# Patient Record
Sex: MALE | Race: ASIAN | NOT HISPANIC OR LATINO | Employment: FULL TIME | URBAN - METROPOLITAN AREA
[De-identification: names, ages, dates, MRNs, and addresses within clinical notes are randomized per-mention and may not be internally consistent; named-entity substitution may affect disease eponyms.]

---

## 2017-05-23 DIAGNOSIS — M25.561 ACUTE PAIN OF RIGHT KNEE: Primary | ICD-10-CM

## 2017-05-23 NOTE — PROGRESS NOTES
3 weeks history of medial right knee pain.  This started after playing football.  He twisted his knee and started feeling the pain.  Now he has fullness on the back of the knee as well.  I over-the-counter Aleve without significant improvement.  Since worsening.    No past medical history on file.    No past surgical history on file.    Review of patient's allergies indicates:  No Known Allergies    Current Outpatient Prescriptions   Medication Sig Dispense Refill    acetaminophen (TYLENOL) 500 MG tablet Take 500 mg by mouth every 6 (six) hours as needed.        naproxen sodium (ANAPROX) 220 MG tablet Take 220 mg by mouth 2 (two) times daily with meals.         No current facility-administered medications for this visit.        Family History   Problem Relation Age of Onset    Diabetes Mother     Heart disease Mother        Social History     Social History    Marital status: Unknown     Spouse name: N/A    Number of children: N/A    Years of education: N/A     Occupational History    Not on file.     Social History Main Topics    Smoking status: Never Smoker    Smokeless tobacco: Never Used    Alcohol use Not on file    Drug use: Unknown    Sexual activity: Not on file     Other Topics Concern    Not on file     Social History Narrative    No narrative on file       On exam:  Positive medial right knee tenderness.  Positive Bri's test.  No ligamentous instability.  No erythema.  The knee is not hot.  There is some fullness suggesting Bakers cyst.      Assessment:   1. Acute pain of right knee  Ambulatory consult to Physical Therapy    MRI Lower Extremity Joint WO Cont Right     Plan:  We discussed with the patient the assessment and recommendations. The following is the plan we agreed on:  1.  MRI of the right knee.  2.  Physical therapy.  3.  Return after MRI.  If it's positive we will consult orthopedics sports medicine

## 2017-05-24 ENCOUNTER — HOSPITAL ENCOUNTER (OUTPATIENT)
Dept: RADIOLOGY | Facility: OTHER | Age: 52
Discharge: HOME | End: 2017-05-24
Attending: ANESTHESIOLOGY
Payer: COMMERCIAL

## 2017-05-24 DIAGNOSIS — M25.561 ACUTE PAIN OF RIGHT KNEE: ICD-10-CM

## 2017-05-24 PROCEDURE — 73721 MRI JNT OF LWR EXTRE W/O DYE: CPT | Mod: TC,RT

## 2017-05-24 PROCEDURE — 73721 MRI JNT OF LWR EXTRE W/O DYE: CPT | Mod: 26,RT,, | Performed by: RADIOLOGY

## 2018-02-09 ENCOUNTER — OFFICE VISIT (OUTPATIENT)
Dept: SPORTS MEDICINE | Facility: CLINIC | Age: 53
End: 2018-02-09
Payer: COMMERCIAL

## 2018-02-09 ENCOUNTER — HOSPITAL ENCOUNTER (OUTPATIENT)
Dept: RADIOLOGY | Facility: HOSPITAL | Age: 53
Discharge: HOME | End: 2018-02-09
Attending: ORTHOPAEDIC SURGERY
Payer: COMMERCIAL

## 2018-02-09 VITALS
DIASTOLIC BLOOD PRESSURE: 67 MMHG | SYSTOLIC BLOOD PRESSURE: 110 MMHG | WEIGHT: 190 LBS | BODY MASS INDEX: 25.73 KG/M2 | HEART RATE: 75 BPM | HEIGHT: 72 IN

## 2018-02-09 DIAGNOSIS — M25.562 LEFT KNEE PAIN: ICD-10-CM

## 2018-02-09 DIAGNOSIS — M25.40 JOINT EFFUSION: ICD-10-CM

## 2018-02-09 DIAGNOSIS — M25.562 LEFT KNEE PAIN: Primary | ICD-10-CM

## 2018-02-09 PROCEDURE — 3008F BODY MASS INDEX DOCD: CPT | Mod: S$GLB,,, | Performed by: ORTHOPAEDIC SURGERY

## 2018-02-09 PROCEDURE — 99203 OFFICE O/P NEW LOW 30 MIN: CPT | Mod: S$GLB,,, | Performed by: ORTHOPAEDIC SURGERY

## 2018-02-09 PROCEDURE — 73564 X-RAY EXAM KNEE 4 OR MORE: CPT | Mod: TC,50,FY,PO

## 2018-02-09 PROCEDURE — 99999 PR PBB SHADOW E&M-EST. PATIENT-LVL III: CPT | Mod: PBBFAC,,, | Performed by: ORTHOPAEDIC SURGERY

## 2018-02-09 PROCEDURE — 73564 X-RAY EXAM KNEE 4 OR MORE: CPT | Mod: 26,50,, | Performed by: RADIOLOGY

## 2018-02-09 NOTE — PROGRESS NOTES
CC: Left knee pain    52 y.o. Male with a history of left knee pain who initially injured his knee in 2012.  History of meniscus tear.  No new CORNELIUS.  Reports worsening left knee pain over the last 2 weeks.  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  Has tried ice and Aleve PRN.  He states that the pain is severe and not responding to any conservative care.      He reports that the pain and weakness. It also bothers him at night.     + mechanical symptoms, - instability    Is affecting ADLs.  Pain is 8/10 at it's worst.    REVIEW OF SYSTEMS:  Constitution: Negative. Negative for chills, fever and night sweats.   HENT: Negative for congestion and headaches.    Eyes: Negative for blurred vision, left vision loss and right vision loss.   Cardiovascular: Negative for chest pain and syncope.   Respiratory: Negative for cough and shortness of breath.    Endocrine: Negative for polydipsia, polyphagia and polyuria.   Hematologic/Lymphatic: Negative for bleeding problem. Does not bruise/bleed easily.   Skin: Negative for dry skin, itching and rash.   Musculoskeletal: Negative for falls. Positive for left knee pain and  muscle weakness.   Gastrointestinal: Negative for abdominal pain and bowel incontinence.   Genitourinary: Negative for bladder incontinence and nocturia.   Neurological: Negative for disturbances in coordination, loss of balance and seizures.   Psychiatric/Behavioral: Negative for depression. The patient does not have insomnia.    Allergic/Immunologic: Negative for hives and persistent infections.     PAST MEDICAL HISTORY:    History reviewed. No pertinent past medical history.    PAST SURGICAL HISTORY:   History reviewed. No pertinent surgical history.    FAMILY HISTORY:   Family History   Problem Relation Age of Onset    Diabetes Mother     Heart disease Mother        SOCIAL HISTORY:   Social History     Social History    Marital status:      Spouse name: N/A    Number of  children: N/A    Years of education: N/A     Occupational History    Not on file.     Social History Main Topics    Smoking status: Never Smoker    Smokeless tobacco: Never Used    Alcohol use Not on file    Drug use: Unknown    Sexual activity: Not on file     Other Topics Concern    Not on file     Social History Narrative    No narrative on file       MEDICATIONS:     Current Outpatient Prescriptions:     acetaminophen (TYLENOL) 500 MG tablet, Take 500 mg by mouth every 6 (six) hours as needed.  , Disp: , Rfl:     naproxen sodium (ANAPROX) 220 MG tablet, Take 220 mg by mouth 2 (two) times daily with meals.  , Disp: , Rfl:     ALLERGIES:   Review of patient's allergies indicates:  No Known Allergies    VITAL SIGNS:   /67   Pulse 75   Ht 6' (1.829 m)   Wt 86.2 kg (190 lb)   BMI 25.77 kg/m²      PHYSICAL EXAMINATION  General:  The patient is alert and oriented x 3.  Mood is pleasant.  Observation of ears, eyes and nose reveal no gross abnormalities.  No labored breathing observed.    LEFT KNEE EXAMINATION     OBSERVATION / INSPECTION   Gait:   Nonantalgic   Alignment:  Neutral   Scars:   None   Muscle atrophy: Mild  Effusion:  2+  Warmth:  None   Discoloration:   none     TENDERNESS / CREPITUS (T / C):          T / C      T / C   Patella   - / -   Lateral joint line   + / -   Peripatellar medial  -  Medial joint line    + / -   Peripatellar lateral -  Medial plica   - / -   Patellar tendon -   Popliteal fossa   - / -   Quad tendon   -   Gastrocnemius   -   Prepatellar Bursa - / -   Quadricep   -   Tibial tubercle  -  Thigh/hamstring  -   Pes anserine/HS -  Fibula    -   ITB   - / -  Tibia     -   Tib/fib joint  - / -  LCL    -     MFC   - / -   MCL: Proximal  -    LFC   - / -    Distal   -          ROM: (* = pain)  PASSIVE   ACTIVE    Left :   5 / 0 / 145   5 / 0 / 145     Right :    5 / 0 / 145   5 / 0 / 145    Patellofemoral examination:  See above noted areas of tenderness.   Patella  position    Subluxation / dislocation: Centered           Sup. / Inf;   Normal   Crepitus (PF):    Absent   Patellar Mobility:       Medial-lateral:   Normal    Superior-inferior:  Normal    Inferior tilt   Normal    Patellar tendon:  Normal   Lateral tilt:    Normal   J-sign:     None   Patellofemoral grind:   No pain       MENISCAL SIGNS:     Pain on terminal extension:  -  Pain on terminal flexion:  -  Bris maneuver:  + (for pain)  Squat     + (for pain)    LIGAMENT EXAMINATION:  ACL / Lachman:  normal (-1 to 2mm)    PCL-Post.  drawer: normal 0 to 2mm  MCL- Valgus:  normal 0 to 2mm  LCL- Varus:  normal 0 to 2mm  Pivot shift: normal (Equal)   Dial Test: difference c/w other side   At 30° flexion: normal (< 5°)    At 90° flexion: normal (< 5°)   Reverse Pivot Shift:   normal (Equal)     STRENGTH: (* = with pain) PAINFUL SIDE   Quadricep   5/5   Hamstrin/5    EXTREMITY NEURO-VASCULAR EXAMINATION:   Sensation:  Grossly intact to light touch all dermatomal regions.   Motor Function:  Fully intact motor function at hip, knee, foot and ankle    DTRs;  quadriceps and  achilles 2+.  No clonus and downgoing Babinski.    Vascular status:  DP and PT pulses 2+, brisk capillary refill, symmetric.     XRAY (): Very minimal medial compartment tibiofemoral joint space narrowing is observed bilaterally, which has developed since the 2012 exam referenced above.  No patellofemoral joint space narrowing of significance.  Osseous structures demonstrate no evidence of recent or healing fracture, lytic destructive process, osteochondral defect, or other significant abnormality.  The lateral view of the left knee demonstrates some soft tissue fullness in the suprapatellar bursa on this side, consistent with a joint effusion, also a finding which was not present previously.  No joint effusion on the right.     ASSESSMENT:    Left knee pain    PLAN:   1. MRI left knee  2. Offered to aspirated effusion today.  Patient  declined.  3. Follow up in clinic for MRI results    All questions were answered, pt will contact us for questions or concerns in the interim.

## 2019-02-28 DIAGNOSIS — M25.511 SHOULDER PAIN, BILATERAL: Primary | ICD-10-CM

## 2019-02-28 DIAGNOSIS — M25.512 SHOULDER PAIN, BILATERAL: Primary | ICD-10-CM

## 2019-03-12 ENCOUNTER — CLINICAL SUPPORT (OUTPATIENT)
Dept: REHABILITATION | Facility: HOSPITAL | Age: 54
End: 2019-03-12
Payer: COMMERCIAL

## 2019-03-12 DIAGNOSIS — M79.601 PAIN IN BOTH UPPER EXTREMITIES: ICD-10-CM

## 2019-03-12 DIAGNOSIS — M25.60 STIFFNESS IN JOINT: ICD-10-CM

## 2019-03-12 DIAGNOSIS — M79.602 PAIN IN BOTH UPPER EXTREMITIES: ICD-10-CM

## 2019-03-12 DIAGNOSIS — R53.1 WEAKNESS: ICD-10-CM

## 2019-03-12 PROCEDURE — 97165 OT EVAL LOW COMPLEX 30 MIN: CPT | Mod: PN

## 2019-03-12 PROCEDURE — 97110 THERAPEUTIC EXERCISES: CPT | Mod: PN

## 2019-03-13 PROBLEM — M25.60 STIFFNESS IN JOINT: Status: ACTIVE | Noted: 2019-03-13

## 2019-03-13 PROBLEM — M79.602 PAIN IN BOTH UPPER EXTREMITIES: Status: ACTIVE | Noted: 2019-03-13

## 2019-03-13 PROBLEM — M79.601 PAIN IN BOTH UPPER EXTREMITIES: Status: ACTIVE | Noted: 2019-03-13

## 2019-03-13 PROBLEM — R53.1 WEAKNESS: Status: ACTIVE | Noted: 2019-03-13

## 2019-03-13 NOTE — PLAN OF CARE
[facility]  Initial Evaluation     Date: 3/12/2019  Patient: Naresh Grayson  Chart Number: [id number]    Therapy Diagnosis:   Encounter Diagnoses   Name Primary?    Pain in both upper extremities     Weakness     Stiffness in joint      Physician: Alejandro Mendenhall MD    Physician Orders: OT eval and treat  Medical Diagnosis: M25.511,M25.512 (ICD-10-CM) - Shoulder pain, bilateral  Evaluation Date: 3/12/2019  Insurance Authorization period Expiration: 12/31/2019  Plan of Care Expiration Period: 5/10/2019    Visit # / Visits Authorized: 1 / 50  Time In:505  Time Out: 610  Total Billable Time: 65 minutes  LCE1 TE1    Precautions: Standard    Subjective     Involved Side: Bilateral; Left greater than Right  Dominant Side: Right  Date of Onset: 2-3 months  Mechanism of Injury: insidious onset with progressively worsening symptoms of Bilateral shoulder pain Left greater than right; pt states he may have injured shoulders while working out however 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   History of Current Condition: Pt presents to clinic today with referral for bilateral shoulder pain, weakness and stiffness, Left greater than Right.   Surgical Procedure: none  Imaging: xray revealed no abnormalities; no MRI  Previous Therapy: no formal therapy has been doing some band exercises on his own.     Patient's Goals for Therapy: to decrease pain and increase functional use. 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     Current FunctionalStatus   Home/Living environment : lives with their family      Limitation of Functional Status as follows:   ADLs/IADLs:     - Feeding: Independent    - Bathing: Independent    - Dressing/Grooming: Independent    - Driving: Independent     Leisure: gym routine, fishing      Past Medical History/Physical Systems Review:   Naresh Grayson  has no past medical history on file.    Naresh Grayson  has no past surgical history on file.    Naresh has a current medication list which includes the following prescription(s): acetaminophen, naproxen sodium, pantoprazole, and promethazine.    Review of patient's allergies indicates:  No Known Allergies       Objective     Sensation Test: Patient denies any numbness/tingling    Observation/Inspection:rounded shoulders    Range of Motion/Strength:   Shoulder  Left   Right  Pain/Dysfunction with Movement    AROM PROM MMT AROM PROM MMT    Flexion 140 150 4-/5 155 WFL 4/5    Extension 50 NT 5/5 60 WFL 4/5    Abduction 90 150 4-/5 155 WFL 4/5    HorizAdduction 35 NT 4-/5 40 WFL 4/5    Internal rotation L4 25 4/5 L4 WFL 4/5    ER at 90° abd 65* 65 4-/5 90 55* 4/5    ER at 0° abd 70 60* 4-/5 60 WFL 4/5    NT=Not tested; *pain    ROM Comments:   Pain at end range    Painful Arc: none noted in Bilateral shoulders    Tenderness upon Palpation:      Positive: Lateral Subacromial Space, Bicipital Groove and Supraspinatus Region on Left side     Special Tests:  AC Joint Left Right   Empty Can Test - -   Drop Arm test - -   Hawkin's Kenndy + -   Neer's Test + -     Scapular Control/Dyskinesis:    Normal / Subtle / Obvious  Comments    Left  obvi -    Right  obvi -       CMS Impairment/Limitation/Restriction for FOTO Shoulder Survey    Therapist reviewed FOTO scores for Naresh Grayson on 3/12/2019.   FOTO documents entered into [employer] - see Media section.    Limitation Score: 47%  Category: Self Care    Current :  "CK = at least 40% but < 60% impaired, limited or restricted  Goal: CI = at least 1% but < 20% impaired, limited or restricted         Treatment     Treatment Time In: 550  Treatment Time Out: 610  Total Treatment time separate from Evaluation time:10    Naresh received therapeutic exercises for 20 minutes including:  -Shoulder flexion with the wand 5 repetitions, Sidelying Abduction 5 repetitions, Sidelying External Rotation 5 repetitions, Standing Dowel Abduction 5 repetitions, Theraband pull downs 5 repetitions, Theraband Rows 5 repetitions, Theraband External Rotation 5 repetitions, Theraband Internal Rotation 5 repetitions, Theraband Horizontal Abduction 5 repetitions, wall pushups 5 repetitions, corner pectoralis stretch 1/30", Internal rotation pulley stretch 1/30"    Home Exercise Program/Education:  Issued HEP (see patient instructions in EMR) and educated on modality use for pain management . Exercises were reviewed and Naresh was able to demonstrate them prior to the end of the session.   Pt received a written copy of exercises to perform at home. Naresh demonstrated good  understanding of the education provided.  Pt was advised to perform these exercises free of pain, and to stop performing them if pain occurs.    Patient/Family Education: role of OT, goals for OT, scheduling/cancellations - pt verbalized understanding. Discussed insurance limitations with patient.    Assessment     Naresh Grayson is a 53 y.o. male referred to outpatient occupational therapy and presents with a medical diagnosis of Bilateral shoulder pain Left greater than Right, resulting in Decreased ROM, Decreased muscle strength, Increased pain and Joint Stiffness and demonstrates limitations as described in the chart below. Following medical record review it is determined that pt will benefit from occupational therapy services in order to maximize pain free and/or functional use of bilateral shoulders. The following goals were " discussed with the patient and patient is in agreement with them as to be addressed in the treatment plan. The patient's rehab potential is Good.     Anticipated barriers to occupational therapy: none  Pt has no cultural, educational or language barriers to learning provided.    Profile and History Assessment of Occupational Performance Level of Clinical Decision Making Complexity Score   Occupational Profile:   Naresh Grayson is a 53 y.o. male who lives with their family and is currently employed as gastroenterologist/surgeon. Naresh Grayson has difficulty with  grooming and dressing  housework/household chores and gym routine and fishing  affecting his/her daily functional abilities. His/her main goal for therapy is to decrease pain and increase functional use Left shoulder.     Comorbidities:   None noted    Medical and Therapy History Review:   Brief               Performance Deficits    Physical:  Joint Mobility  Joint Stability  Muscle Power/Strength  Muscle Endurance  Muscle Tone  Postural Control  Pain    Cognitive:  No Deficits    Psychosocial:    No Deficits     Clinical Decision Making:  low    Assessment Process:  Problem-Focused Assessments    Modification/Need for Assistance:  Not Necessary    Intervention Selection:  Several Treatment Options       low  Based on PMHX, co morbidities , data from assessments and functional level of assistance required with task and clinical presentation directly impacting function.       The following goals were discussed with the patient and patient is in agreement with them as to be addressed in the treatment plan.     Goals:     Short Term Goals to be met in 4 weeks: (4/11/2019)  1) Initiate Hep   2) Pt will increase Left shoulder AROM by 10 degrees grossly for improved performance with overhead ADL's  3) Pt will report 4/10 pain in (Left)shoulder at worst  4) Pt will demonstrate increased MMT to 4+/5 grossly Left shoulder  5) Patient will be able to achieve  less than or equal to 25% on FOTO shoulder survey demonstrating overall improved functional ability with upper extremity.     Long Term Goals to be met by discharge:  1) Independent with HEP  2) Pt will demonstrate (Left) shoulder AROM WNL grossly for [place] with ADL's  3) Pt will demonstrate (Left) shoulder MMT WNL grossly for [place] with functional activities  4) Independent and pain free with ADL's and IADL's  5) Patient will be able to achieve less than or equal to 10% on FOTO shoulder survey demonstrating overall improved functional ability with upper extremity.       Plan   Certification Period/Plan of care expiration: 3/12/2019 to 5/10/2019.    Outpatient Occupational Therapy 2 times weekly for 8 weeks to include the following interventions: Manual therapy/joint mobilizations, Modalities for pain management, Therapeutic exercises/activities., Strengthening, Joint Protection and Energy Conservation.      TARAS Rojo

## 2019-04-12 ENCOUNTER — CLINICAL SUPPORT (OUTPATIENT)
Dept: REHABILITATION | Facility: HOSPITAL | Age: 54
End: 2019-04-12
Payer: COMMERCIAL

## 2019-04-12 DIAGNOSIS — M25.60 STIFFNESS IN JOINT: ICD-10-CM

## 2019-04-12 DIAGNOSIS — M79.602 PAIN IN BOTH UPPER EXTREMITIES: ICD-10-CM

## 2019-04-12 DIAGNOSIS — M79.601 PAIN IN BOTH UPPER EXTREMITIES: ICD-10-CM

## 2019-04-12 DIAGNOSIS — R53.1 WEAKNESS: ICD-10-CM

## 2019-04-12 PROCEDURE — 97140 MANUAL THERAPY 1/> REGIONS: CPT | Mod: PN

## 2019-04-12 PROCEDURE — 97110 THERAPEUTIC EXERCISES: CPT | Mod: PN

## 2019-04-12 NOTE — PROGRESS NOTES
"  Occupational Therapy Daily Treatment Note     Name: Naresh Grayson  Clinic Number: [id number]    Therapy Diagnosis:   Encounter Diagnoses   Name Primary?    Pain in both upper extremities     Weakness     Stiffness in joint      Physician: Alejandro Mendenhall MD    Visit Date: 4/12/2019   Physician Orders: OT eval and treat  Medical Diagnosis: M25.511,M25.512 (ICD-10-CM) - Shoulder pain, bilateral  Evaluation Date: 3/12/2019  Insurance Authorization period Expiration: 12/31/2019  Plan of Care Expiration Period: 5/10/2019     Visit # / Visits Authorized: 2 / 50  Time In:8:07 am  Time Out: 9:00 am   Total Billable Time: 53 minutes  1 MT 3 TE     Precautions: Standard      Subjective     Pt reports: "Its been getting better but the one movement on my side is a real killer"  he was compliant with home exercise program given last session.   Response to previous treatment:Fair required increased cues for proper exercises techs  Functional change: Improved ROM    Pain: 0/10 5/10 with PROM  Location: bilateral shoulder  But L more than R    Objective     Naresh received the following manual therapy techniques for 10 minutes:   -Mt: Pt recieved manual therapy consisting of PROM in all planes, joint mobilization (grades I-II) with gentle oscillations at acromioclavicular and glenohumeral joint along with myofascial release and STM to surrounding musculature (biceps, pects, deltoids, traps, triceps etc.) to decrease stiffness and pain with movements.       Naresh received therapeutic exercises for 43 minutes including:  -  Exercises        PROM (BUE) Shoulder Flexion/Abduction/Internal rotation/External Rotation 10x   Supine dowel Flexion/Chest Press 3#  2/15   Sidelying Abduction 0#  2/15   Sidelying External Rotation 2/15  2/15       Sleeper Stretch 3/30"               Corner pectoralis stretch 3/30"   Theraband Lats Green  2/15   Theraband Rows Green  2/15   Theraband Internal Rotation/External Rotation Green  2/15      "   Range of Motion/Strength:   Shoulder   Left     Right   Pain/Dysfunction with Movement     AROM PROM MMT AROM PROM MMT     Flexion 140 150 4-/5 155 WFL 4/5     Extension 50 NT 5/5 60 WFL 4/5     Abduction 90 150 4-/5 155 WFL 4/5     HorizAdduction 35 NT 4-/5 40 WFL 4/5     Internal rotation L4 25 4/5 L4 WFL 4/5     ER at 90° abd 65* 65 4-/5 90 55* 4/5     ER at 0° abd 70 60* 4-/5 60 WFL 4/5     NT=Not tested; *pain     Shoulder L and R Date:4/12/2019        AROM L/R PROM L/R MMT   Flx [id number] 155/WNL    Ext 55/65     Abd 100/155 150/WNL    ER 70/60 65/WNL    IR L4/L4 wnl    ER @ 90 60/75 55/65                      Home Exercises and Education Provided     Education provided:   - None  - Progress towards goals     Written Home Exercises Provided: Patient instructed to cont prior HEP.  Exercises were reviewed and Naresh was able to demonstrate them prior to the end of the session.  Naresh demonstrated good  understanding of the HEP provided.   .   See EMR under Patient Instructions for exercises provided prior visit.        Assessment     Pt would continue to benefit from skilled OT. He did well with session. Main focus on reeducation for exercises and techniques to ensure pt completing exercises accurately. He had a lot of questions regarding a few exercises and reasoning for pain with OT explanation provided. Pt did well with session. ST tightness and main restrictions with bilateral pects and deltoid pain. Tolerated MT and PROM fair. Still limited mainly with abduction and ER. OT reviewed exercises program with pt with good carryover and understanding noted. Pt will be out of town next week and follow up the following week with therapy requesting only Friday appts.      Naresh is progressing well towards his goals and there are no updates to goals at this time. Pt prognosis is Good.     Pt will continue to benefit from skilled outpatient occupational therapy to address the deficits listed in the problem list on  initial evaluation provide pt/family education and to maximize pt's level of independence in the home and community environment.     Anticipated barriers to occupational therapy: scheduling, pt is a surgeon with limited appointment availability.     Pt's spiritual, cultural and educational needs considered and pt agreeable to plan of care and goals.    Goals:  Short Term Goals to be met in 4 weeks: (4/11/2019)  1) Initiate Hep MET 3/12/19  2) Pt will increase Left shoulder AROM by 10 degrees grossly for improved performance with overhead ADL's Progressing 4/12/2019  3) Pt will report 4/10 pain in (Left)shoulder at worst Progressing 4/12/2019  4) Pt will demonstrate increased MMT to 4+/5 grossly Left shoulder Progressing 4/12/2019  5) Patient will be able to achieve less than or equal to 25% on FOTO shoulder survey demonstrating overall improved functional ability with upper extremity. Progressing 4/12/2019       Long Term Goals to be met by discharge:  1) Independent with HEP Progressing 4/12/2019  2) Pt will demonstrate (Left) shoulder AROM WNL grossly for [place] with ADL's Progressing 4/12/2019  3) Pt will demonstrate (Left) shoulder MMT WNL grossly for [place] with functional activities Progressing 4/12/2019  4) Independent and pain free with ADL's and IADL's Progressing 4/12/2019  5) Patient will be able to achieve less than or equal to 10% on FOTO shoulder survey demonstrating overall improved functional ability with upper extremity. Progressing 4/12/2019         Plan   Continue per initial POC.   Updates/Grading for next session: Progress as tolerated.      Jordan Andrade, OT

## 2019-04-26 ENCOUNTER — CLINICAL SUPPORT (OUTPATIENT)
Dept: REHABILITATION | Facility: HOSPITAL | Age: 54
End: 2019-04-26
Payer: COMMERCIAL

## 2019-04-26 DIAGNOSIS — M79.601 PAIN IN BOTH UPPER EXTREMITIES: ICD-10-CM

## 2019-04-26 DIAGNOSIS — M79.602 PAIN IN BOTH UPPER EXTREMITIES: ICD-10-CM

## 2019-04-26 DIAGNOSIS — M25.60 STIFFNESS IN JOINT: ICD-10-CM

## 2019-04-26 DIAGNOSIS — R53.1 WEAKNESS: ICD-10-CM

## 2019-04-26 PROCEDURE — 97110 THERAPEUTIC EXERCISES: CPT | Mod: PN

## 2019-04-26 PROCEDURE — 97140 MANUAL THERAPY 1/> REGIONS: CPT | Mod: PN

## 2019-04-26 NOTE — PROGRESS NOTES
"  Occupational Therapy Daily Treatment Note     Name: Naresh Grayson  Clinic Number: [id number]    Therapy Diagnosis:   Encounter Diagnoses   Name Primary?    Pain in both upper extremities     Weakness     Stiffness in joint      Physician: Alejandro Mendenhall MD    Visit Date: 4/26/2019   Physician Orders: OT eval and treat  Medical Diagnosis: M25.511,M25.512 (ICD-10-CM) - Shoulder pain, bilateral  Evaluation Date: 3/12/2019  Insurance Authorization period Expiration: 12/31/2019  Plan of Care Expiration Period: 5/10/2019     Visit # / Visits Authorized: 3 / 50  Time In:8:45 am  Time Out: 9:30 am   Total Billable Time: 45 minutes  1 MT 2 TE      Precautions: Standard      Subjective     Pt reports: "Its ok I didn't do much exercises on my vacation but they were helping before"  he was compliant with home exercise program given last session.   Response to previous treatment:Fair required increased cues for proper exercises techs  Functional change: Improved ROM    Pain: 0/10 5/10 with PROM on L with ER abduction  Location: bilateral shoulder  But L more than R    Objective     Naresh received the following manual therapy techniques for 15 minutes:   -Mt: Pt recieved manual therapy consisting of PROM in all planes, joint mobilization (grades I-II) with gentle oscillations at acromioclavicular and glenohumeral joint along with myofascial release and STM to surrounding musculature (biceps, pects, deltoids, traps, triceps etc.) to decrease stiffness and pain with movements. L and R UE      Naresh received therapeutic exercises for 30 minutes including:  -  Exercises        PROM (BUE) Shoulder Flexion/Abduction/Internal rotation/External Rotation 10x   Supine dowel Flexion/Chest Press 3#  2/15   Sidelying Abduction 1#  2/15   Sidelying External Rotation 2/15     Supine pect stretch   Butterfly stretch adapted unable to put hands behind head 3/30"   Sleeper Stretch 3/30"   Wall angels unable    Wall slides towel  2/15   IR " "stretch 3/30"       Ext with dowel  1#   2/15                Home Exercises and Education Provided     Education provided:   - None  - Progress towards goals     Written Home Exercises Provided: Patient instructed to cont prior HEP.  Exercises were reviewed and Naresh was able to demonstrate them prior to the end of the session.  Naresh demonstrated good  understanding of the HEP provided.   .   See EMR under Patient Instructions for exercises provided prior visit.        Assessment     Pt would continue to benefit from skilled OT. He did well this session with stiffness in pects and with ER noted. ST restrictions and tightness limiting ROM with more MT focus next session. He did well with PROM and stretching in supine. Still limited with periscap strength and mobility into ER. Very difficult to perform wall angels due to lack of abduction and ER. More focus on periscap strengthening and new exercises today. He tolerated these well. Next session would benefit from more periscap exercises and MT with updated HEP to be given.      Naresh is progressing well towards his goals and there are no updates to goals at this time. Pt prognosis is Good.     Pt will continue to benefit from skilled outpatient occupational therapy to address the deficits listed in the problem list on initial evaluation provide pt/family education and to maximize pt's level of independence in the home and community environment.     Anticipated barriers to occupational therapy: scheduling, pt is a surgeon with limited appointment availability.     Pt's spiritual, cultural and educational needs considered and pt agreeable to plan of care and goals.    Goals:  Short Term Goals to be met in 4 weeks: (4/11/2019)  1) Initiate Hep MET 3/12/19  2) Pt will increase Left shoulder AROM by 10 degrees grossly for improved performance with overhead ADL's Progressing 4/26/2019  3) Pt will report 4/10 pain in (Left)shoulder at worst Progressing 4/26/2019  4) Pt will " demonstrate increased MMT to 4+/5 grossly Left shoulder Progressing 4/26/2019  5) Patient will be able to achieve less than or equal to 25% on FOTO shoulder survey demonstrating overall improved functional ability with upper extremity. Progressing 4/26/2019       Long Term Goals to be met by discharge:  1) Independent with HEP Progressing 4/26/2019  2) Pt will demonstrate (Left) shoulder AROM WNL grossly for [place] with ADL's Progressing 4/26/2019  3) Pt will demonstrate (Left) shoulder MMT WNL grossly for [place] with functional activities Progressing 4/26/2019  4) Independent and pain free with ADL's and IADL's Progressing 4/26/2019  5) Patient will be able to achieve less than or equal to 10% on FOTO shoulder survey demonstrating overall improved functional ability with upper extremity. Progressing 4/26/2019         Plan   Continue per initial POC.   Updates/Grading for next session: Progress as tolerated.      Jordan Andrade, OT

## 2019-05-03 ENCOUNTER — CLINICAL SUPPORT (OUTPATIENT)
Dept: REHABILITATION | Facility: HOSPITAL | Age: 54
End: 2019-05-03
Payer: COMMERCIAL

## 2019-05-03 DIAGNOSIS — M79.602 PAIN IN BOTH UPPER EXTREMITIES: ICD-10-CM

## 2019-05-03 DIAGNOSIS — M25.60 STIFFNESS IN JOINT: ICD-10-CM

## 2019-05-03 DIAGNOSIS — M79.601 PAIN IN BOTH UPPER EXTREMITIES: ICD-10-CM

## 2019-05-03 DIAGNOSIS — R53.1 WEAKNESS: ICD-10-CM

## 2019-05-03 PROCEDURE — 97110 THERAPEUTIC EXERCISES: CPT | Mod: PN

## 2019-05-03 PROCEDURE — 97140 MANUAL THERAPY 1/> REGIONS: CPT | Mod: PN

## 2019-05-03 NOTE — PATIENT INSTRUCTIONS
"Using the Foam Roll for Thoracic Mobility and Posture     1. Lie on your back on the ground and notice what parts of your spine and soft tissue are in contact with the ground. Pay specific attention to how far your shoulders are off the ground. You can also take your arms out to your sides with your elbows bent, and move your shoulders into external rotation, noticing how much movement you   have and how far your hands are off the floor.           2. Lie on the foam roll with the roll parallel and directly under your spine. Your head should be resting on the roll while your knees are bent and feet are on the ground to maintain balance.             3. With your shoulders flexed to 90 degrees and your elbows straight, protract your shoulder blades and reach for the ceiling, then retract your shoulder blades, moving your hands away from the ceiling. Focus on reaching as far as you can, then "wrap" your shoulder blades around the roll as you retract. Repeat 10 times. Then perform the same movement but reach for the ceiling with one arm at a time. Repeat 5 times with each arm.                    4. Flex and extend your shoulders, arms going in opposite directions. Repeat 10 times.            5. Take your arms out to the side (horizontal abduction) and then cross them over your chest   (horizontal adduction). Repeat 10 times.                6. With your shoulders abducted to 90 degrees, elbows bent to 90 degrees, and arms in external rotation, slowly move your arms up and down (parallel to floor) to see where your pecs are the tightest. When you find a tight area, stop and rest in that position for 30 seconds. A 1 - 3 pound weight can be placed in each hand during the stretch for increased stress if desired. Repeat this at other points in the range that are tight.                      11.Stand upright and lift your sternum (breast bone) to bring your shoulder blades back and down and to open up your chest. Try to maintain " S: Feeling well. No significant complaints or concerns. Reports consistent, daily fetal mvmt. Denies ctxs, LOF, or vaginal bldng. Denies travel to [place] affected area. Started taking iron supplement, Flordadix, for anemia. O: See prenatal flowsheet for maternal and fetal exam  Blood pressure 118/74, weight 166 lb (75.3 kg), last menstrual period 2018. A:  33w3d   Size=Dates    P: reviewed PTL precautions.    See prenatal checklist for other topics covered during today's visit  RTO in  2 weeks for MAURY with CNOLGA this position during your daily activities.     ©[id number] [employer] All Rights Reserved.

## 2019-05-03 NOTE — PROGRESS NOTES
"  Occupational Therapy Daily Treatment Note     Name: Naresh Grayson  Clinic Number: [id number]    Therapy Diagnosis:   Encounter Diagnoses   Name Primary?    Pain in both upper extremities     Weakness     Stiffness in joint      Physician: Alejandro Mendenhall MD    Visit Date: 5/3/2019   Physician Orders: OT eval and treat  Medical Diagnosis: M25.511,M25.512 (ICD-10-CM) - Shoulder pain, bilateral  Evaluation Date: 3/12/2019  Insurance Authorization period Expiration: 12/31/2019  Plan of Care Expiration Period: 5/10/2019     Visit # / Visits Authorized: 4 / 50  Time In:8:25 am  Time Out: 9:20 am   Total Billable Time: 55 minutes  1 MT 3 TE      Precautions: Standard      Subjective     Pt reports: "Still some of the same movements bothering me but I feel a little better"  he was compliant with home exercise program given last session.   Response to previous treatment:Fair required increased cues for proper exercises techs  Functional change: Improved ROM    Pain: 0/10 5/10 with PROM on L with ER abduction  Location: bilateral shoulder  But L more than R    Objective     Naresh received the following manual therapy techniques for 15 minutes:   -Mt: Pt recieved manual therapy consisting of PROM in all planes, joint mobilization (grades I-II) with gentle oscillations at acromioclavicular and glenohumeral joint along with myofascial release and STM to surrounding musculature (biceps, pects, deltoids, traps, triceps etc.) to decrease stiffness and pain with movements. L and R UE      Naresh received therapeutic exercises for 30 minutes including:  -  Exercises        PROM (BUE) Shoulder Flexion/Abduction/Internal rotation/External Rotation 10x   Supine dowel Flexion/Chest Press 4#  2/15   Sidelying Abduction 1#  2/15   Sidelying External Rotation 1#  2/15     Supine pect stretch   Butterfly stretch adapted unable to put hands behind head 3/30"   Sleeper Stretch 3/30"   Wall angels unable    Wall slides towel  2/15 " "  Horizontal abduction  Red band  2/15       Foam roll stretches for thoracic mobs and periscap stretches    Foam roll stretch 3/30"   Scap retract/protraction 2/10    Alternating flex/ext BUEs  2/10   Wall angels 2/10             Shoulder L Date:5/3/2019        AROM PROM MMT   Flx 155 160    Ext 60 WNL    Abd 110 156    ER 80 70    IR L4 WNL    ER @ 90 60 65                         Home Exercises and Education Provided     Education provided:   - None  - Progress towards goals     Written Home Exercises Provided: Patient instructed to cont prior HEP.  Exercises were reviewed and Naresh was able to demonstrate them prior to the end of the session.  Naresh demonstrated good  understanding of the HEP provided.   .   See EMR under Patient Instructions for exercises provided prior visit.        Assessment     Pt would continue to benefit from skilled OT. He did a lot better with OT session today. Main location of pain over middle deltoid insertion but was able to get relief with MT and PROM. He had more pain with flexion of the shoulder mostly and was still limited with abduction with external rotation. Still increased pect stiffness and rounded shoulders. States trying to correct posture more throughout the week. He did better with periscap exercises and showed good understanding of new exercises and HEP. Still limited with periscap strength and mobility into ER.   Naresh is progressing well towards his goals and there are no updates to goals at this time. Pt prognosis is Good.     Pt will continue to benefit from skilled outpatient occupational therapy to address the deficits listed in the problem list on initial evaluation provide pt/family education and to maximize pt's level of independence in the home and community environment.     Anticipated barriers to occupational therapy: scheduling, pt is a surgeon with limited appointment availability.     Pt's spiritual, cultural and educational needs considered and pt " agreeable to plan of care and goals.    Goals:  Short Term Goals to be met in 4 weeks: (4/11/2019)  1) Initiate Hep MET 3/12/19  2) Pt will increase Left shoulder AROM by 10 degrees grossly for improved performance with overhead ADL's Progressing 5/3/2019  3) Pt will report 4/10 pain in (Left)shoulder at worst Progressing 5/3/2019  4) Pt will demonstrate increased MMT to 4+/5 grossly Left shoulder Progressing 5/3/2019  5) Patient will be able to achieve less than or equal to 25% on FOTO shoulder survey demonstrating overall improved functional ability with upper extremity. Progressing 5/3/2019       Long Term Goals to be met by discharge:  1) Independent with HEP Progressing 5/3/2019  2) Pt will demonstrate (Left) shoulder AROM WNL grossly for [place] with ADL's Progressing 5/3/2019  3) Pt will demonstrate (Left) shoulder MMT WNL grossly for [place] with functional activities Progressing 5/3/2019  4) Independent and pain free with ADL's and IADL's Progressing 5/3/2019  5) Patient will be able to achieve less than or equal to 10% on FOTO shoulder survey demonstrating overall improved functional ability with upper extremity. Progressing 5/3/2019         Plan   Continue per initial POC.   Updates/Grading for next session: Progress as tolerated.      Jordan Andrade OT

## 2019-05-16 NOTE — PROGRESS NOTES
"  Occupational Therapy Daily Treatment Note     Name: Naresh Grayson  Clinic Number: [id number]    Therapy Diagnosis:   Encounter Diagnoses   Name Primary?    Pain in both upper extremities     Weakness     Stiffness in joint      Physician: Alejandro Mendenhall MD    Visit Date: 5/17/2019   Physician Orders: OT eval and treat  Medical Diagnosis: M25.511,M25.512 (ICD-10-CM) - Shoulder pain, bilateral  Evaluation Date: 3/12/2019  Insurance Authorization period Expiration: 12/31/2019  Plan of Care Expiration Period: 5/10/2019  FOTO improved to 39% from 47% at eval     Visit # / Visits Authorized: 5 / 50  Time In:8:45 am  Time Out: 9:23 am   Total Billable Time: 38  minutes  1 MT 2 TE      Precautions: Standard      Subjective     Pt reports: "I wasn't able to do my stuff in like the past two weeks abelardo been really busy"  he was compliant with home exercise program given last session.   Response to previous treatment:Fair required increased cues for proper exercises techs  Functional change: Improved ROM    Pain: 0/10 5/10 with PROM on L with ER abduction  Location: bilateral shoulder  But L more than R    Objective     Naresh received the following manual therapy techniques for 10 minutes:   -Mt: Pt recieved manual therapy consisting of PROM in all planes, joint mobilization (grades I-II) with gentle oscillations at acromioclavicular and glenohumeral joint along with myofascial release and STM to surrounding musculature (biceps, pects, deltoids, traps, triceps etc.) to decrease stiffness and pain with movements. L and R UE      Naresh received therapeutic exercises for 28 minutes including:  -  Exercises        PROM (BUE) Shoulder Flexion/Abduction/Internal rotation/External Rotation 10x       Sidelying Abduction 2#  2/15       Supine pect stretch   Butterfly stretch adapted unable to put hands behind head 3/30"                       Foam roll stretches for thoracic mobs and periscap stretches    Foam roll stretch 3/30" "   Scap retract/protraction/ Serratus Punch 2/10    Alternating flex/ext BUEs  2/10   Wall angels 2/10       UPDATE:   Shoulder L  3/12/19    5/17/19     AROM PROM AROM PROM   Flx 155 160 168 164   Ext 60 WNL 73 WNL   Abd 110 156 112 170   ER 80 70 62 70   IR L4 WNL L4 87 (WNL)   ER @ 90 60 65 76 50   Horizontal abduction   30               Home Exercises and Education Provided     Education provided:   - None  - Progress towards goals     Written Home Exercises Provided: Patient instructed to cont prior HEP.  Exercises were reviewed and Naresh was able to demonstrate them prior to the end of the session.  Naresh demonstrated good  understanding of the HEP provided.   .   See EMR under Patient Instructions for exercises provided prior visit.        Assessment     Pt would continue to benefit from skilled OT. Pt tolerated session well with improved passive and active ROM today. He tolerated exercises better through better range with less restrictions. Better understanding of exercises and proper techniques used. He only has pain in the deltoid with main limitation being abductions. More focus on abduction and periscap strengthening. He was unable to tolerate Ts, Ys and W's on therapy ball. He is still limited by weakness and stiffness with ST restrictions. Naresh is progressing well towards his goals and there are no updates to goals at this time. Pt prognosis is Good.     Pt will continue to benefit from skilled outpatient occupational therapy to address the deficits listed in the problem list on initial evaluation provide pt/family education and to maximize pt's level of independence in the home and community environment.     Anticipated barriers to occupational therapy: scheduling, pt is a surgeon with limited appointment availability.     Pt's spiritual, cultural and educational needs considered and pt agreeable to plan of care and goals.    Goals:  Short Term Goals to be met in 4 weeks: (4/11/2019)  1) Initiate Hep  MET 3/12/19  2) Pt will increase Left shoulder AROM by 10 degrees grossly for improved performance with overhead ADL's Progressing 5/17/2019  3) Pt will report 4/10 pain in (Left)shoulder at worst Progressing 5/17/2019  4) Pt will demonstrate increased MMT to 4+/5 grossly Left shoulder Progressing 5/17/2019  5) Patient will be able to achieve less than or equal to 25% on FOTO shoulder survey demonstrating overall improved functional ability with upper extremity. Progressing 5/17/2019       Long Term Goals to be met by discharge:  1) Independent with HEP Progressing 5/17/2019  2) Pt will demonstrate (Left) shoulder AROM WNL grossly for [place] with ADL's Progressing 5/17/2019  3) Pt will demonstrate (Left) shoulder MMT WNL grossly for [place] with functional activities Progressing 5/17/2019  4) Independent and pain free with ADL's and IADL's Progressing 5/17/2019  5) Patient will be able to achieve less than or equal to 10% on FOTO shoulder survey demonstrating overall improved functional ability with upper extremity. Progressing 5/17/2019         Plan   Continue per initial POC.   Updates/Grading for next session: Progress as tolerated.      Jordan Andrade, OT

## 2019-05-17 ENCOUNTER — CLINICAL SUPPORT (OUTPATIENT)
Dept: REHABILITATION | Facility: HOSPITAL | Age: 54
End: 2019-05-17
Payer: COMMERCIAL

## 2019-05-17 DIAGNOSIS — R53.1 WEAKNESS: ICD-10-CM

## 2019-05-17 DIAGNOSIS — M25.60 STIFFNESS IN JOINT: ICD-10-CM

## 2019-05-17 DIAGNOSIS — M79.601 PAIN IN BOTH UPPER EXTREMITIES: ICD-10-CM

## 2019-05-17 DIAGNOSIS — M79.602 PAIN IN BOTH UPPER EXTREMITIES: ICD-10-CM

## 2019-05-17 PROCEDURE — 97110 THERAPEUTIC EXERCISES: CPT | Mod: PN

## 2019-05-17 PROCEDURE — 97140 MANUAL THERAPY 1/> REGIONS: CPT | Mod: PN

## 2019-05-31 ENCOUNTER — CLINICAL SUPPORT (OUTPATIENT)
Dept: REHABILITATION | Facility: HOSPITAL | Age: 54
End: 2019-05-31
Payer: COMMERCIAL

## 2019-05-31 DIAGNOSIS — M79.601 PAIN IN BOTH UPPER EXTREMITIES: ICD-10-CM

## 2019-05-31 DIAGNOSIS — M79.602 PAIN IN BOTH UPPER EXTREMITIES: ICD-10-CM

## 2019-05-31 DIAGNOSIS — M25.60 STIFFNESS IN JOINT: ICD-10-CM

## 2019-05-31 DIAGNOSIS — R53.1 WEAKNESS: ICD-10-CM

## 2019-05-31 PROCEDURE — 97110 THERAPEUTIC EXERCISES: CPT | Mod: PN

## 2019-05-31 PROCEDURE — 97140 MANUAL THERAPY 1/> REGIONS: CPT | Mod: PN

## 2019-05-31 NOTE — PLAN OF CARE
"  Outpatient Therapy Updated Plan of Care     Visit Date: 5/31/2019  Name: Naresh Grayson  Clinic Number: [id number]    Therapy Diagnosis:   Encounter Diagnoses   Name Primary?    Pain in both upper extremities     Weakness     Stiffness in joint      Physician: Alejandro Mendenhall MD    Physician Orders: Eval and tx  Medical Diagnosis: Bilateral shoulder pain  Evaluation Date: 3/12/19    Total Visits Received: 7  Cancelled Visits: 1  No Show Visits: 2    Current Certification Period:  5/31/19 to 7/26/19  Precautions:  Standard  Visits from Evaluation Date:  7  Functional Level Prior to Evaluation:  IND    Subjective     Update: "Its steadily getting better it feels better, I want to keep going"    Objective     Update:     UPDATE:   Shoulder L  3/12/19  Left    5/17/19  Left  5/31/19  Left     AROM PROM AROM PROM AROM PROM   Flx 155 160 168 164 165  170   Ext 60 WNL 73 WNL 75 (+2) WNL   Abd 110 156 112 170 170(+58) 165   ER 80 70 62 70 65 (+3) 70   IR L4 WNL L4 87 (WNL) L4 WNL   ER @ 90 60 65 76 50 90 (+14) 76   Horizontal abduction   30                   Assessment     Update: Pt continues to improve each session. Functional AROM and PROM improved with assessment today. He states less pain and that the shoulder is feeling better at home and with work. Still having noted stiffness and limitations mainly with abduction and external rotation of the shoulder as well as internal rotation of the shoulder. Progressing well with strength program with compliance with HEP and good understanding of new and established exercises. Pt is motivated. Pt would continue to benefit from skilled OT services to increase ROM, strength, activity tolerance, and Fm/ GM coordination in order to increase safety and IND with ADLs.         Pt would continue to benefit from skilled OT. Pt tolerated session well with improved passive and active ROM today. He tolerated exercises better through better range with less restrictions. Better " understanding of exercises and proper techniques used. He only has pain in the deltoid with main limitation being abductions. More focus on abduction and periscap strengthening. He was unable to tolerate Ts, Ys and W's on therapy ball. He is still limited by weakness and stiffness with ST restrictions. Naresh is progressing well towards his goals and there are no updates to goals at this time. Pt prognosis is Good.     Pt will continue to benefit from skilled outpatient occupational therapy to address the deficits listed in the problem list on initial evaluation provide pt/family education and to maximize pt's level of independence in the home and community environment.     Anticipated barriers to occupational therapy: scheduling, pt is a surgeon with limited appointment availability.     Pt's spiritual, cultural and educational needs considered and pt agreeable to plan of care and goals.    Goals:    Previous Short Term Goals Status:   4/5 met  Short Term Goals to be met in 4 weeks: (4/11/2019)  1) Initiate Hep MET 3/12/19  2) Pt will increase Left shoulder AROM by 10 degrees grossly for improved performance with overhead ADL' s MET5/31/2019  3) Pt will report 4/10 pain in (Left)shoulder at worst MET 5/31/2019  4) Pt will demonstrate increased MMT to 4+/5 grossly Left shoulder MET 5/31/2019  5) Patient will be able to achieve less than or equal to 25% on FOTO shoulder survey demonstrating overall improved functional ability with upper extremity. Progressing 5/31/2019    New Short Term Goals Status:   2/5 met  Long Term Goals to be met by discharge:  1) Independent with HEP MET 5/31/2019  2) Pt will demonstrate (Left) shoulder AROM WNL grossly for [place] with ADL's MET 5/31/2019  3) Pt will demonstrate (Left) shoulder MMT WNL grossly for [place] with functional activities Progressing 5/31/2019  4) Independent and pain free with ADL's and IADL's Progressing 5/31/2019  5) Patient will be able to achieve less  than or equal to 10% on FOTO shoulder survey demonstrating overall improved functional ability with upper extremity. Progressing 5/31/2019      Long Term Goal Status:   continue per initial plan of care.  Reasons for Recertification of Therapy:   Pt continues to benefit from skilled services and has made good progress thus far with good future rehab potential. Pt remains limited with BUE strength, ROM and coordination at this time which all still impact their performance of ADLs , IADLs affecting her habits, roles and routines.      Plan     Updated Certification Period: 5/31/2019 to 7/26/19  Recommended Treatment Plan: 1 times per week for 8 weeks: Manual Therapy, Moist Heat/ Ice, Neuromuscular Re-ed, Orthotic Management and Training, Patient Education, Self Care, Therapeutic Activites and Therapeutic Exercise  Other Recommendations: K tape, Cupping, UPOC, Orthotic training PRN.       Jordan Andrade, OT  5/31/2019      I CERTIFY THE NEED FOR THESE SERVICES FURNISHED UNDER THIS PLAN OF TREATMENT AND WHILE UNDER MY CARE    Physician's comments:        Physician's Signature: ___________________________________________________

## 2019-05-31 NOTE — PROGRESS NOTES
"  Occupational Therapy Daily Treatment Note     Name: Naresh Grayson  Clinic Number: [id number]    Therapy Diagnosis:   Encounter Diagnoses   Name Primary?    Pain in both upper extremities     Weakness     Stiffness in joint      Physician: Alejandro Mendenhall MD    Visit Date: 5/31/2019   Physician Orders: OT eval and treat  Medical Diagnosis: M25.511,M25.512 (ICD-10-CM) - Shoulder pain, bilateral  Evaluation Date: 3/12/2019  Insurance Authorization period Expiration: 12/31/2019  Plan of Care Expiration Period:   FOTO improved to 39% from 47% at eval     Visit # / Visits Authorized: 7 / 50  Time In:8:50 am  Time Out: 9:35 am   Total Billable Time: 45  minutes  1 MT 2 TE      Precautions: Standard      Subjective     Pt reports: "Its been going pretty good actually nothing has been hurting me" I want to keep going.   he was compliant with home exercise program given last session.   Response to previous treatment:Fair required increased cues for proper exercises techs  Functional change: Improved ROM    Pain: 0/10 5/10 with PROM on L with ER abduction  Location: bilateral shoulder  But L more than R    Objective     Naresh received the following manual therapy techniques for 10 minutes:   -Mt: Pt recieved manual therapy consisting of PROM in all planes, joint mobilization (grades I-II) with gentle oscillations at acromioclavicular and glenohumeral joint along with myofascial release and STM to surrounding musculature (biceps, pects, deltoids, traps, triceps etc.) to decrease stiffness and pain with movements. L and R UE      Naresh received therapeutic exercises for 35 minutes including:  -  Exercises        PROM (BUE) Shoulder Flexion/Abduction/Internal rotation/External Rotation 10x       Sidelying Abduction 2#  2/15   Foam roll exercises    Supine pect stretch   Butterfly stretch able to put hand behind head today 3/30"   Foam roll stretches for thoracic mobs and periscap stretches        Horizontal abduction  Red " band  2/15   Alternating flex/ext BUEs  2/15       Wall angels Unable to touch wall but improved ER with abduction  2/10    T band exercises  Ext  scap squeezes Blue  2/15               Home Exercises and Education Provided     Education provided:   - None  - Progress towards goals     Written Home Exercises Provided: Patient instructed to cont prior HEP.  Exercises were reviewed and Naresh was able to demonstrate them prior to the end of the session.  Naresh demonstrated good  understanding of the HEP provided.   .   See EMR under Patient Instructions for exercises provided prior visit.        Assessment   See tx section for UPOC.

## 2019-06-14 ENCOUNTER — CLINICAL SUPPORT (OUTPATIENT)
Dept: REHABILITATION | Facility: HOSPITAL | Age: 54
End: 2019-06-14
Payer: COMMERCIAL

## 2019-06-14 DIAGNOSIS — R53.1 WEAKNESS: ICD-10-CM

## 2019-06-14 DIAGNOSIS — M79.601 PAIN IN BOTH UPPER EXTREMITIES: ICD-10-CM

## 2019-06-14 DIAGNOSIS — M79.602 PAIN IN BOTH UPPER EXTREMITIES: ICD-10-CM

## 2019-06-14 DIAGNOSIS — M25.60 STIFFNESS IN JOINT: ICD-10-CM

## 2019-06-14 PROCEDURE — 97110 THERAPEUTIC EXERCISES: CPT | Mod: PN

## 2019-06-14 PROCEDURE — 97140 MANUAL THERAPY 1/> REGIONS: CPT | Mod: PN

## 2019-06-14 NOTE — PROGRESS NOTES
"  Occupational Therapy Daily Treatment Note     Name: Naresh Grayson  Clinic Number: [id number]    Therapy Diagnosis:   Encounter Diagnoses   Name Primary?    Pain in both upper extremities     Weakness     Stiffness in joint      Physician: Alejandro Mendenhall MD    Visit Date: 6/14/2019   Physician Orders: OT eval and treat  Medical Diagnosis: M25.511,M25.512 (ICD-10-CM) - Shoulder pain, bilateral  Evaluation Date: 3/12/2019  Insurance Authorization period Expiration: 12/31/2019  Plan of Care Expiration Period:   FOTO improved to 39% from 47% at eval     Visit # / Visits Authorized: 8 / 50  Time In: 900 am  Time Out: 955 am  Total Billable Time: 55  minutes  2 MT 2 TE      Precautions: Standard      Subjective     Pt reports: "Its hurts in the same spot on both sides." Pt pointing to posterior shld  he was compliant with home exercise program given last session.   Response to previous treatment:Fair required increased cues for proper exercises techs  Functional change: Improved ROM    Pain: 0/10 5/10 with PROM on L with ER abduction  Location: bilateral shoulder  But L more than R    Objective     Naresh received the following manual therapy techniques for 25 minutes:   -Mt: Pt recieved manual therapy consisting of PROM in all planes with gentle end range stretching, joint mobilization (grades I-II) with gentle oscillations at acromioclavicular and glenohumeral joint along with myofascial release and STM to surrounding musculature (biceps, pects, deltoids, traps, triceps etc.) to decrease stiffness and pain with movements. L and R UE      Naresh received therapeutic exercises for 30 minutes including:  -  Exercises        PROM (BUE) Shoulder Flexion/Abduction/Internal rotation/External Rotation 10x   Supine dowel flexion  5#  2/15   Sidelying Abduction 2#  2/15   Foam roll exercises    Supine pect stretch   Butterfly stretch able to put hand behind head today 3/30"   Foam roll stretches for thoracic mobs and " periscap stretches        Horizontal abduction  Red band  2/15   Alternating flex/ext BUEs  2/15       Wall angels Unable to touch wall but improved ER with abduction   2/10    T band exercises  Ext  scap squeezes Blue  2/15               Home Exercises and Education Provided     Education provided:   - Continued education on technique with PRE's and stretches and maintaining correct posture throughout day.   - Progress towards goals     Written Home Exercises Provided: Patient instructed to cont prior HEP.  Exercises were reviewed and Naresh was able to demonstrate them prior to the end of the session.  Naresh demonstrated good  understanding of the HEP provided.   .   See EMR under Patient Instructions for exercises provided prior visit.        Assessment     Pt would continue to benefit from skilled OT. Pt tolerated session well with A/PROM improving after MT and stretches.  Pt continues with B shld pain primarily at mid/post deltoid region with main limitations being abduction and ER. Min vc's required for technique and posture during PRE's.  Pt reports improved ability to perform wall angels this date. He is still limited by weakness and stiffness with ST restrictions. aNresh is progressing well towards his goals and there are no updates to goals at this time. Pt prognosis is Good.     Pt will continue to benefit from skilled outpatient occupational therapy to address the deficits listed in the problem list on initial evaluation provide pt/family education and to maximize pt's level of independence in the home and community environment.     Anticipated barriers to occupational therapy: scheduling, pt is a surgeon with limited appointment availability.     Pt's spiritual, cultural and educational needs considered and pt agreeable to plan of care and goals.    Goals:  Short Term Goals to be met in 4 weeks: (4/11/2019)  1) Initiate Hep MET 3/12/19  2) Pt will increase Left shoulder AROM by 10 degrees grossly for  improved performance with overhead ADL's Progressing 5/17/2019  3) Pt will report 4/10 pain in (Left)shoulder at worst Progressing 5/17/2019  4) Pt will demonstrate increased MMT to 4+/5 grossly Left shoulder Progressing 5/17/2019  5) Patient will be able to achieve less than or equal to 25% on FOTO shoulder survey demonstrating overall improved functional ability with upper extremity. Progressing 5/17/2019       Long Term Goals to be met by discharge:  1) Independent with HEP Progressing 5/17/2019  2) Pt will demonstrate (Left) shoulder AROM WNL grossly for [place] with ADL's Progressing 5/17/2019  3) Pt will demonstrate (Left) shoulder MMT WNL grossly for [place] with functional activities Progressing 5/17/2019  4) Independent and pain free with ADL's and IADL's Progressing 5/17/2019  5) Patient will be able to achieve less than or equal to 10% on FOTO shoulder survey demonstrating overall improved functional ability with upper extremity. Progressing 5/17/2019         Plan   Continue per initial POC.   Updates/Grading for next session: Progress as tolerated.      JULIUS Saldana/ISRA

## 2019-06-21 ENCOUNTER — CLINICAL SUPPORT (OUTPATIENT)
Dept: REHABILITATION | Facility: HOSPITAL | Age: 54
End: 2019-06-21
Payer: COMMERCIAL

## 2019-06-21 DIAGNOSIS — R53.1 WEAKNESS: ICD-10-CM

## 2019-06-21 DIAGNOSIS — M25.60 STIFFNESS IN JOINT: ICD-10-CM

## 2019-06-21 DIAGNOSIS — M79.602 PAIN IN BOTH UPPER EXTREMITIES: ICD-10-CM

## 2019-06-21 DIAGNOSIS — M79.601 PAIN IN BOTH UPPER EXTREMITIES: ICD-10-CM

## 2019-06-21 PROCEDURE — 97110 THERAPEUTIC EXERCISES: CPT | Mod: PN

## 2019-06-21 PROCEDURE — 97140 MANUAL THERAPY 1/> REGIONS: CPT | Mod: PN

## 2019-06-21 NOTE — PROGRESS NOTES
"  Occupational Therapy Daily Treatment Note     Name: Naresh Grayson  Clinic Number: [id number]    Therapy Diagnosis:   Encounter Diagnoses   Name Primary?    Pain in both upper extremities     Weakness     Stiffness in joint      Physician: Alejandro Mendenhall MD    Visit Date: 6/21/2019   Physician Orders: OT eval and treat  Medical Diagnosis: M25.511,M25.512 (ICD-10-CM) - Shoulder pain, bilateral  Evaluation Date: 3/12/2019  Insurance Authorization period Expiration: 12/31/2019  Plan of Care Expiration Period:   FOTO improved to 39% from 47% at eval     Visit # / Visits Authorized: 9 / 50  Time In: 900 am  Time Out: 935 am  Total Billable Time: 35  minutes  1 MT 1 TE  *Tx shortened 2/2 pt needing to leave early to get father to bring back here for PT eval.      Precautions: Standard      Subjective     Pt reports: "It only hurts with certain motions. Then when I get out of that position it doesn't hurt."  he was compliant with home exercise program given last session.   Response to previous treatment:Fair required increased cues for proper exercises techs  Functional change: Improved ROM    Pain: 0/10 5/10 with PROM  with ER and abduction  Location: bilateral shoulder L greater than R    Objective     Naresh received the following manual therapy techniques for 15 minutes:   -Mt: Pt recieved manual therapy consisting of PROM in all planes with gentle end range stretching, joint mobilization (grades I-II) with gentle oscillations at acromioclavicular and glenohumeral joint along with myofascial release and STM to surrounding musculature (biceps, pects, deltoids, traps, triceps etc.) to decrease stiffness and pain with movements. L and R UE      Naresh received therapeutic exercises for 20 minutes including:    Exercises        PROM (BUE) Shoulder Flexion/Abduction/Internal rotation/External Rotation 10x   Supine dowel flexion  5#  2/15   Sidelying Abduction 2#  2/15   Foam roll exercises    Supine pect stretch " "  Butterfly stretch able to put hand behind head today 3/30"   Foam roll stretches for thoracic mobs and periscap stretches        Horizontal abduction  Red band  2/15   Alternating flex/ext BUEs  2/15       Wall angels   NT Unable to touch wall but improved ER with abduction   2/10    T band exercises   NT  Ext  scap squeezes Blue  2/15               Home Exercises and Education Provided     Education provided:   - Continued education on technique with PRE's and stretches and maintaining correct posture throughout day.   - Progress towards goals     Written Home Exercises Provided: Patient instructed to cont prior HEP.  Exercises were reviewed and Naresh was able to demonstrate them prior to the end of the session.  Naresh demonstrated good  understanding of the HEP provided.   .   See EMR under Patient Instructions for exercises provided prior visit.        Assessment     Pt would continue to benefit from skilled OT. Pt tolerated session well with A/PROM improving after MT and stretches.  Pt continues with B shld pain primarily at mid/post deltoid region with main limitations being abduction and ER.  Minimal  pain with end range flexion and IR as well.  Min vc's required for technique and posture during PRE's.  He is still limited by weakness and stiffness with ST restrictions. Naresh is progressing well towards his goals and there are no updates to goals at this time. Pt prognosis is Good.     Pt will continue to benefit from skilled outpatient occupational therapy to address the deficits listed in the problem list on initial evaluation provide pt/family education and to maximize pt's level of independence in the home and community environment.     Anticipated barriers to occupational therapy: scheduling, pt is a surgeon with limited appointment availability.     Pt's spiritual, cultural and educational needs considered and pt agreeable to plan of care and goals.    Goals:  Short Term Goals to be met in 4 weeks: " (4/11/2019)  1) Initiate Hep MET 3/12/19  2) Pt will increase Left shoulder AROM by 10 degrees grossly for improved performance with overhead ADL's Progressing 5/17/2019  3) Pt will report 4/10 pain in (Left)shoulder at worst Progressing 5/17/2019  4) Pt will demonstrate increased MMT to 4+/5 grossly Left shoulder Progressing 5/17/2019  5) Patient will be able to achieve less than or equal to 25% on FOTO shoulder survey demonstrating overall improved functional ability with upper extremity. Progressing 5/17/2019       Long Term Goals to be met by discharge:  1) Independent with HEP Progressing 5/17/2019  2) Pt will demonstrate (Left) shoulder AROM WNL grossly for [place] with ADL's Progressing 5/17/2019  3) Pt will demonstrate (Left) shoulder MMT WNL grossly for [place] with functional activities Progressing 5/17/2019  4) Independent and pain free with ADL's and IADL's Progressing 5/17/2019  5) Patient will be able to achieve less than or equal to 10% on FOTO shoulder survey demonstrating overall improved functional ability with upper extremity. Progressing 5/17/2019         Plan   Continue per initial POC.   Updates/Grading for next session: Progress as tolerated.      JULIUS Saldana/ISRA

## 2020-03-25 ENCOUNTER — DOCUMENTATION ONLY (OUTPATIENT)
Dept: REHABILITATION | Facility: HOSPITAL | Age: 55
End: 2020-03-25

## 2020-03-25 PROBLEM — M79.602 PAIN IN BOTH UPPER EXTREMITIES: Status: RESOLVED | Noted: 2019-03-13 | Resolved: 2020-03-25

## 2020-03-25 PROBLEM — M25.60 STIFFNESS IN JOINT: Status: RESOLVED | Noted: 2019-03-13 | Resolved: 2020-03-25

## 2020-03-25 PROBLEM — R53.1 WEAKNESS: Status: RESOLVED | Noted: 2019-03-13 | Resolved: 2020-03-25

## 2020-03-25 PROBLEM — M79.601 PAIN IN BOTH UPPER EXTREMITIES: Status: RESOLVED | Noted: 2019-03-13 | Resolved: 2020-03-25

## 2020-03-25 NOTE — PROGRESS NOTES
REHAB SERVICES OUTPATIENT DISCHARGE SUMMARY  Occupational Therapy      Name:  Naresh Grayson  Date:  3/25/2020  Date of Evaluation:  3/12/19  Physician:  Phoebe  Total # Of Visits:  8  Cancelled:  0  No Shows:  1  Diagnosis:  Bilateral Shoulder pain  Physical/Functional Status:  At time of discharge, patient was able to increase strength and ROM but unable to reduce pain.     The patient is to be discharged from our Therapy service for the following reason(s):  Patient has not attended therapy since 6/21/19    Degree of Goal Achievement:  Patient has partially met goals    Patient Education:  HEP at [facility].     Discharge Plan:  Home Program:  HEP

## 2020-04-30 DIAGNOSIS — Z01.84 ANTIBODY RESPONSE EXAMINATION: ICD-10-CM

## 2020-05-30 DIAGNOSIS — Z01.84 ANTIBODY RESPONSE EXAMINATION: ICD-10-CM

## 2020-06-29 DIAGNOSIS — Z01.84 ANTIBODY RESPONSE EXAMINATION: ICD-10-CM

## 2020-07-29 DIAGNOSIS — Z01.84 ANTIBODY RESPONSE EXAMINATION: ICD-10-CM

## 2020-08-28 DIAGNOSIS — Z01.84 ANTIBODY RESPONSE EXAMINATION: ICD-10-CM

## 2020-09-27 DIAGNOSIS — Z01.84 ANTIBODY RESPONSE EXAMINATION: ICD-10-CM

## 2020-10-27 DIAGNOSIS — Z01.84 ANTIBODY RESPONSE EXAMINATION: ICD-10-CM

## 2020-11-26 DIAGNOSIS — Z01.84 ANTIBODY RESPONSE EXAMINATION: ICD-10-CM

## 2023-02-01 DIAGNOSIS — R14.0 BLOATING: Primary | ICD-10-CM

## 2023-02-03 ENCOUNTER — TELEPHONE (OUTPATIENT)
Dept: PHARMACY | Facility: CLINIC | Age: 58
End: 2023-02-03
Payer: COMMERCIAL

## 2023-02-13 ENCOUNTER — TELEPHONE (OUTPATIENT)
Dept: PHARMACY | Facility: CLINIC | Age: 58
End: 2023-02-13
Payer: COMMERCIAL

## 2023-02-13 NOTE — TELEPHONE ENCOUNTER
Hello,     The prior authorization for Naresh Grayson's XIFAXAN  prescription has been APPROVED FROM 02/10/2023 TO 03/24/2023 with copayment of $1082.17.       [facility] @ [phone] will reach out to patient for further correspondence.       If there are any additional questions or concerns, please contact me.    Sincerely,  Treva Garcia  Prior Authorization Department  [facility]  [phone]

## 2023-04-06 RX ORDER — PANTOPRAZOLE SODIUM 40 MG/1
40 TABLET, DELAYED RELEASE ORAL DAILY
Qty: 90 TABLET | Refills: 3 | Status: SHIPPED | OUTPATIENT
Start: 2023-04-06

## 2024-04-22 RX ORDER — MINOXIDIL 2.5 MG/1
2.5 TABLET ORAL DAILY
Qty: 90 TABLET | Refills: 3 | OUTPATIENT
Start: 2024-04-22